# Patient Record
Sex: FEMALE | Race: WHITE | Employment: OTHER | URBAN - METROPOLITAN AREA
[De-identification: names, ages, dates, MRNs, and addresses within clinical notes are randomized per-mention and may not be internally consistent; named-entity substitution may affect disease eponyms.]

---

## 2017-02-08 ENCOUNTER — HOSPITAL ENCOUNTER (OUTPATIENT)
Dept: GENERAL RADIOLOGY | Age: 72
Discharge: HOME | End: 2017-02-08
Payer: MEDICARE

## 2017-02-08 DIAGNOSIS — R05.9 COUGH: ICD-10-CM

## 2017-02-08 PROCEDURE — 71020 XR CHEST PA LAT: CPT

## 2017-04-10 ENCOUNTER — OFFICE VISIT (OUTPATIENT)
Dept: FAMILY MEDICINE CLINIC | Age: 72
End: 2017-04-10

## 2017-04-10 VITALS
BODY MASS INDEX: 25.76 KG/M2 | RESPIRATION RATE: 16 BRPM | TEMPERATURE: 98.3 F | DIASTOLIC BLOOD PRESSURE: 89 MMHG | HEIGHT: 65 IN | WEIGHT: 154.6 LBS | HEART RATE: 82 BPM | OXYGEN SATURATION: 98 % | SYSTOLIC BLOOD PRESSURE: 143 MMHG

## 2017-04-10 DIAGNOSIS — R68.89 FLU-LIKE SYMPTOMS: ICD-10-CM

## 2017-04-10 DIAGNOSIS — J11.1 INFLUENZA: Primary | ICD-10-CM

## 2017-04-10 PROBLEM — I49.1 PAC (PREMATURE ATRIAL CONTRACTION): Status: ACTIVE | Noted: 2017-04-10

## 2017-04-10 LAB
QUICKVUE INFLUENZA TEST: POSITIVE
VALID INTERNAL CONTROL?: YES

## 2017-04-10 RX ORDER — CODEINE PHOSPHATE AND GUAIFENESIN 10; 100 MG/5ML; MG/5ML
5 SOLUTION ORAL
Qty: 118 ML | Refills: 0 | Status: SHIPPED | OUTPATIENT
Start: 2017-04-10 | End: 2017-07-26 | Stop reason: ALTCHOICE

## 2017-04-10 RX ORDER — OSELTAMIVIR PHOSPHATE 75 MG/1
75 CAPSULE ORAL 2 TIMES DAILY
Qty: 10 CAP | Refills: 0 | Status: SHIPPED | OUTPATIENT
Start: 2017-04-10 | End: 2017-04-15

## 2017-04-10 NOTE — PROGRESS NOTES
Reeta Duane is a 73 y.o. female   Chief Complaint   Patient presents with    Fever     Fever at 100 this weekend, wet cough, achy, joint aches. No OTC meds, in bed all weekend    Pt thinks she has the flu,  Has been sick in bed all weekend. + fever around 100, took a couple asa yesterday afternoon without much relief.  + cough wet, but non productive. Cough is keeping her up at night. Took a tylenol pm last night with some relief. Pt also has hx of PAC's had echo and was normal.    she is a 73y.o. year old female who presents for evalution. Reviewed PmHx, RxHx, FmHx, SocHx, AllgHx and updated and dated in the chart. Review of Systems - negative except as listed above in the HPI    Objective:     Vitals:    04/10/17 0847   BP: 143/89   Pulse: 82   Resp: 16   Temp: 98.3 °F (36.8 °C)   TempSrc: Oral   SpO2: 98%   Weight: 154 lb 9.6 oz (70.1 kg)   Height: 5' 5\" (1.651 m)       Current Outpatient Prescriptions   Medication Sig    oseltamivir (TAMIFLU) 75 mg capsule Take 1 Cap by mouth two (2) times a day for 5 days.  guaiFENesin-codeine (CHERATUSSIN AC) 100-10 mg/5 mL solution Take 5 mL by mouth three (3) times daily as needed for Cough. Max Daily Amount: 15 mL.  Cholecalciferol, Vitamin D3, (VITAMIN D3) 1,000 unit cap Take  by mouth.  CALCIUM PO Take 1,200 mg by mouth daily.  MULTIVITAMIN PO Take  by mouth. No current facility-administered medications for this visit.         Physical Examination: General appearance - alert, well appearing, and in no distress  Eyes - pupils equal and reactive, extraocular eye movements intact  Ears - bilateral TM's and external ear canals normal  Nose - normal and patent, no erythema, discharge or polyps  Mouth - mucous membranes moist, pharynx normal without lesions  Neck - supple, no significant adenopathy  Chest - clear to auscultation, no wheezes, rales or rhonchi, symmetric air entry  Heart - normal rate, regular rhythm, normal S1, S2, no murmurs, rubs, clicks or gallops      Assessment/ Plan:   Lela Lyon was seen today for fever. Diagnoses and all orders for this visit:    Flu-like symptoms  -     AMB POC RAPID INFLUENZA TEST    Influenza  -     oseltamivir (TAMIFLU) 75 mg capsule; Take 1 Cap by mouth two (2) times a day for 5 days.  -     guaiFENesin-codeine (CHERATUSSIN AC) 100-10 mg/5 mL solution; Take 5 mL by mouth three (3) times daily as needed for Cough. Max Daily Amount: 15 mL. Follow-up Disposition:  Return if symptoms worsen or fail to improve. I have discussed the diagnosis with the patient and the intended plan as seen in the above orders. The patient has received an after-visit summary and questions were answered concerning future plans. Pt conveyed understanding of plan.     Medication Side Effects and Warnings were discussed with patient      June Espinoza DO

## 2017-04-10 NOTE — PROGRESS NOTES
Chief Complaint   Patient presents with    Fever     Fever at 100 this weekend, wet cough, achy, joint aches.  No OTC meds, in bed all weekend

## 2017-07-26 ENCOUNTER — OFFICE VISIT (OUTPATIENT)
Dept: FAMILY MEDICINE CLINIC | Age: 72
End: 2017-07-26

## 2017-07-26 VITALS
SYSTOLIC BLOOD PRESSURE: 158 MMHG | BODY MASS INDEX: 24.99 KG/M2 | DIASTOLIC BLOOD PRESSURE: 76 MMHG | TEMPERATURE: 97.9 F | HEART RATE: 61 BPM | WEIGHT: 150 LBS | HEIGHT: 65 IN | RESPIRATION RATE: 18 BRPM | OXYGEN SATURATION: 99 %

## 2017-07-26 DIAGNOSIS — H65.93 BILATERAL NON-SUPPURATIVE OTITIS MEDIA: Primary | ICD-10-CM

## 2017-07-26 DIAGNOSIS — R05.9 COUGH: ICD-10-CM

## 2017-07-26 DIAGNOSIS — H61.23 EXCESSIVE EAR WAX, BILATERAL: ICD-10-CM

## 2017-07-26 RX ORDER — IBUPROFEN 800 MG/1
800 TABLET ORAL
COMMUNITY

## 2017-07-26 RX ORDER — CODEINE PHOSPHATE AND GUAIFENESIN 10; 100 MG/5ML; MG/5ML
10 SOLUTION ORAL
Qty: 120 ML | Refills: 0 | Status: SHIPPED | OUTPATIENT
Start: 2017-07-26

## 2017-07-26 RX ORDER — AZITHROMYCIN 250 MG/1
TABLET, FILM COATED ORAL
Qty: 6 TAB | Refills: 0 | Status: SHIPPED | OUTPATIENT
Start: 2017-07-26

## 2017-07-26 NOTE — MR AVS SNAPSHOT
Visit Information Date & Time Provider Department Dept. Phone Encounter #  
 7/26/2017  9:30 AM Nicole Leigh, [address] [phone] [id number] Follow-up Instructions Return if symptoms worsen or fail to improve. Upcoming Health Maintenance Date Due Hepatitis C Screening 1945 DTaP/Tdap/Td series (1 - Tdap) 7/21/1968 BREAST CANCER SCRN MAMMOGRAM 7/21/1997 FOBT Q 1 YEAR AGE 50-75 7/21/1997 GLAUCOMA SCREENING Q2Y 7/21/2012 OSTEOPOROSIS SCREENING (DEXA) 7/21/2012 Pneumococcal 65+ Low/Medium Risk (1 of 2 - PCV13) 7/21/2012 MEDICARE YEARLY EXAM 7/21/2012 INFLUENZA AGE 9 TO ADULT 8/1/2017 Allergies as of 7/26/2017  Review Complete On: 7/26/2017 By: Nicole Leigh NP No Known Allergies Current Immunizations  Never Reviewed Name Date Zoster Vaccine, Live 1/1/2009 Not reviewed this visit You Were Diagnosed With   
  
 Codes Comments Excessive ear wax, bilateral    -  Primary ICD-10-CM: H61.23 
ICD-9-CM: 380.4 Bilateral non-suppurative otitis media     ICD-10-CM: H65.93 
ICD-9-CM: 381.4 Cough     ICD-10-CM: R05 ICD-9-CM: 828. 2 Vitals BP Pulse Temp Resp Height(growth percentile) Weight(growth percentile) 158/76 (BP 1 Location: Left arm, BP Patient Position: At rest) 61 97.9 °F (36.6 °C) (Oral) 18 5' 5\" (1.651 m) 150 lb (68 kg) SpO2 BMI OB Status Smoking Status 99% 24.96 kg/m2 Postmenopausal Never Smoker BMI and BSA Data Body Mass Index Body Surface Area 24.96 kg/m 2 1.77 m 2 Preferred Pharmacy Pharmacy Name Phone [facility] [phone] Your Updated Medication List  
  
   
This list is accurate as of: 7/26/17  9:59 AM.  Always use your most recent med list.  
  
  
  
  
 azithromycin 250 mg tablet Commonly known as:  Francis Clos Take by mouth, take two tablets today then one tablet daily for 4 more days. CALCIUM PO Take 1,200 mg by mouth daily. carbamide peroxide 6.5 % otic solution Commonly known as:  EAR WAX REMOVAL DROPS Administer 5 Drops into each ear two (2) times a day. guaiFENesin-codeine 100-10 mg/5 mL solution Commonly known as:  Shanell Samuel Take 10 mL by mouth three (3) times daily as needed for Cough. Max Daily Amount: 30 mL. ibuprofen 800 mg tablet Commonly known as:  MOTRIN Take 800 mg by mouth every six (6) hours as needed for Pain. MULTIVITAMIN PO Take 1 Tab by mouth daily. VITAMIN D3 1,000 unit Cap Generic drug:  cholecalciferol Take 1,000 Units by mouth daily. Prescriptions Printed Refills  
 guaiFENesin-codeine (GUAIATUSSIN AC) 100-10 mg/5 mL solution 0 Sig: Take 10 mL by mouth three (3) times daily as needed for Cough. Max Daily Amount: 30 mL. Class: Print Route: Oral  
  
Prescriptions Sent to Pharmacy Refills  
 carbamide peroxide (EAR WAX REMOVAL DROPS) 6.5 % otic solution 0 Sig: Administer 5 Drops into each ear two (2) times a day. Class: Normal  
 Pharmacy: [facility], [address] Ph #: [phone] Route: Both Ears  
 azithromycin (ZITHROMAX) 250 mg tablet 0 Sig: Take by mouth, take two tablets today then one tablet daily for 4 more days. Class: Normal  
 Pharmacy: [facility], [address] Ph #: [phone] Follow-up Instructions Return if symptoms worsen or fail to improve. Patient Instructions Cough: Care Instructions Your Care Instructions A cough is your body's response to something that bothers your throat or airways. Many things can cause a cough. You might cough because of a cold or the flu, bronchitis, or asthma.  Smoking, postnasal drip, allergies, and stomach acid that backs up into your throat also can cause coughs. A cough is a symptom, not a disease. Most coughs stop when the cause, such as a cold, goes away. You can take a few steps at home to cough less and feel better. Follow-up care is a key part of your treatment and safety. Be sure to make and go to all appointments, and call your doctor if you are having problems. It's also a good idea to know your test results and keep a list of the medicines you take. How can you care for yourself at home? · Drink lots of water and other fluids. This helps thin the mucus and soothes a dry or sore throat. Honey or lemon juice in hot water or tea may ease a dry cough. · Take cough medicine as directed by your doctor. · Prop up your head on pillows to help you breathe and ease a dry cough. · Try cough drops to soothe a dry or sore throat. Cough drops don't stop a cough. Medicine-flavored cough drops are no better than candy-flavored drops or hard candy. · Do not smoke. Avoid secondhand smoke. If you need help quitting, talk to your doctor about stop-smoking programs and medicines. These can increase your chances of quitting for good. When should you call for help? Call 911 anytime you think you may need emergency care. For example, call if: 
· You have severe trouble breathing. Call your doctor now or seek immediate medical care if: 
· You cough up blood. · You have new or worse trouble breathing. · You have a new or higher fever. · You have a new rash. Watch closely for changes in your health, and be sure to contact your doctor if: 
· You cough more deeply or more often, especially if you notice more mucus or a change in the color of your mucus. · You have new symptoms, such as a sore throat, an earache, or sinus pain. · You do not get better as expected. Where can you learn more? Go to http://seamus-calvin.info/.  
Enter D247 in the search box to learn more about \"Cough: Care Instructions. \" Current as of: March 25, 2017 Content Version: 11.3 © 7508-3855 [employer]. Care instructions adapted under license by [employer] (which disclaims liability or warranty for this information). If you have questions about a medical condition or this instruction, always ask your healthcare professional. [address] any warranty or liability for your use of this information. Introducing [facility]! Dear Katharine Marcano: 
Thank you for requesting a [employer] account. Our records indicate that you already have an active [employer] account. You can access your account anytime at [email or web address] Did you know that you can access your hospital and ER discharge instructions at any time in [employer]? You can also review all of your test results from your hospital stay or ER visit. Additional Information If you have questions, please visit the Frequently Asked Questions section of the [employer] website at [email or web address] Remember, [employer] is NOT to be used for urgent needs. For medical emergencies, dial 911. Now available from your iPhone and Android! Please provide this summary of care documentation to your next provider. Your primary care clinician is listed as Lori Lucas. If you have any questions after today's visit, please call [phone].

## 2017-07-26 NOTE — PROGRESS NOTES
Subjective:   Swetha Bazzi is a 81 y.o. female who complains of congestion, sneezing, sore throat, myalgias and cough keeping her awake at night for 7 days, she has been out of the country to [place] on vacation, she became ill on her last day there and was sick traveling back, and now the cough has become quite severe day and night. She denies a history of shortness of breath and wheezing. No sputum or fever. She has chronic ear wax build-up in both ears, she states she keeps forgetting to use the ear wax removal drops that have been recommended and usually goes into her PCP office for ear wax removal when needed. She denies pain, she is having difficulty hearing well. Evaluation to date: none. Treatment to date: OTC products. Patient does not smoke cigarettes. Relevant PMH: No pertinent additional PMH. Patient Active Problem List   Diagnosis Code    PAC (premature atrial contraction) I49.1     Patient Active Problem List    Diagnosis Date Noted    PAC (premature atrial contraction) 04/10/2017     Current Outpatient Prescriptions   Medication Sig Dispense Refill    ibuprofen (MOTRIN) 800 mg tablet Take 800 mg by mouth every six (6) hours as needed for Pain.  carbamide peroxide (EAR WAX REMOVAL DROPS) 6.5 % otic solution Administer 5 Drops into each ear two (2) times a day. 30 mL 0    azithromycin (ZITHROMAX) 250 mg tablet Take by mouth, take two tablets today then one tablet daily for 4 more days. 6 Tab 0    guaiFENesin-codeine (GUAIATUSSIN AC) 100-10 mg/5 mL solution Take 10 mL by mouth three (3) times daily as needed for Cough. Max Daily Amount: 30 mL. 120 mL 0    Cholecalciferol, Vitamin D3, (VITAMIN D3) 1,000 unit cap Take 1,000 Units by mouth daily.  CALCIUM PO Take 1,200 mg by mouth daily.  MULTIVITAMIN PO Take 1 Tab by mouth daily.        No Known Allergies  Past Medical History:   Diagnosis Date    Schistosomiasis 2000    (treated)     Past Surgical History: Procedure Laterality Date    ENDOSCOPY, COLON, DIAGNOSTIC  1/2007    f/u every 3 yrs.  HX BREAST BIOPSY  1995    (left breast)     Family History   Problem Relation Age of Onset    Arthritis-osteo Mother     Stroke Mother     Colon Cancer Mother     COPD Mother     Heart Failure Mother     Macular Degen Mother     Alcohol abuse Father     Stroke Father     Diabetes Brother      Social History   Substance Use Topics    Smoking status: Never Smoker    Smokeless tobacco: Not on file    Alcohol use 0.0 - 0.5 oz/week     0 - 1 Standard drinks or equivalent per week        Review of Systems  Pertinent items are noted in HPI. Objective:     Visit Vitals    /76 (BP 1 Location: Left arm, BP Patient Position: At rest)    Pulse 61    Temp 97.9 °F (36.6 °C) (Oral)    Resp 18    Ht 5' 5\" (1.651 m)    Wt 150 lb (68 kg)    SpO2 99%    BMI 24.96 kg/m2     General:  alert, cooperative, no distress   Eyes: negative   Ears: abnormal external canal AD - ceruminosis impacting canal, abnormal TM AD - erythematous, bulging, abnormal external canal AS - ceruminosis impacting canal, abnormal TM AS - erythematous, bulging   Sinuses: Normal paranasal sinuses without tenderness   Mouth:  Lips, mucosa, and tongue normal. Teeth and gums normal   Neck: supple, symmetrical, trachea midline and no adenopathy. Heart: S1 and S2 normal, no murmurs noted. Lungs: clear to auscultation bilaterally   Abdomen: soft, non-tender. Bowel sounds normal. No masses,  no organomegaly        Assessment/Plan:   otitis media, cough  Chronic Ceruminosis of both ears requiring regular use of Ear Wax Removal drops, Non-Compliant with treatment  Instructed patient on the importance of keeping ears clear from cerumen build-up by using the ear wax removal drops nightly for 1 week, then 2 x weekly to keep ears clear. Suggested symptomatic OTC remedies. RTC prn.   Orders Placed This Encounter    ibuprofen (MOTRIN) 800 mg tablet Sig: Take 800 mg by mouth every six (6) hours as needed for Pain.  carbamide peroxide (EAR WAX REMOVAL DROPS) 6.5 % otic solution     Sig: Administer 5 Drops into each ear two (2) times a day. Dispense:  30 mL     Refill:  0    azithromycin (ZITHROMAX) 250 mg tablet     Sig: Take by mouth, take two tablets today then one tablet daily for 4 more days. Dispense:  6 Tab     Refill:  0    guaiFENesin-codeine (GUAIATUSSIN AC) 100-10 mg/5 mL solution     Sig: Take 10 mL by mouth three (3) times daily as needed for Cough. Max Daily Amount: 30 mL.      Dispense:  120 mL     Refill:  0

## 2017-07-26 NOTE — PATIENT INSTRUCTIONS
Cough: Care Instructions  Your Care Instructions  A cough is your body's response to something that bothers your throat or airways. Many things can cause a cough. You might cough because of a cold or the flu, bronchitis, or asthma. Smoking, postnasal drip, allergies, and stomach acid that backs up into your throat also can cause coughs. A cough is a symptom, not a disease. Most coughs stop when the cause, such as a cold, goes away. You can take a few steps at home to cough less and feel better. Follow-up care is a key part of your treatment and safety. Be sure to make and go to all appointments, and call your doctor if you are having problems. It's also a good idea to know your test results and keep a list of the medicines you take. How can you care for yourself at home? · Drink lots of water and other fluids. This helps thin the mucus and soothes a dry or sore throat. Honey or lemon juice in hot water or tea may ease a dry cough. · Take cough medicine as directed by your doctor. · Prop up your head on pillows to help you breathe and ease a dry cough. · Try cough drops to soothe a dry or sore throat. Cough drops don't stop a cough. Medicine-flavored cough drops are no better than candy-flavored drops or hard candy. · Do not smoke. Avoid secondhand smoke. If you need help quitting, talk to your doctor about stop-smoking programs and medicines. These can increase your chances of quitting for good. When should you call for help? Call 911 anytime you think you may need emergency care. For example, call if:  · You have severe trouble breathing. Call your doctor now or seek immediate medical care if:  · You cough up blood. · You have new or worse trouble breathing. · You have a new or higher fever. · You have a new rash.   Watch closely for changes in your health, and be sure to contact your doctor if:  · You cough more deeply or more often, especially if you notice more mucus or a change in the color of your mucus. · You have new symptoms, such as a sore throat, an earache, or sinus pain. · You do not get better as expected. Where can you learn more? Go to http://seamus-calvin.info/. Enter D279 in the search box to learn more about \"Cough: Care Instructions. \"  Current as of: March 25, 2017  Content Version: 11.3  © 9992-6530 [employer]. Care instructions adapted under license by [employer] (which disclaims liability or warranty for this information). If you have questions about a medical condition or this instruction, always ask your healthcare professional. [address] any warranty or liability for your use of this information.

## 2019-02-19 ENCOUNTER — HOSPITAL ENCOUNTER (OUTPATIENT)
Dept: CT IMAGING | Age: 74
Discharge: HOME | End: 2019-02-19
Attending: INTERNAL MEDICINE
Payer: SELF-PAY

## 2019-02-19 DIAGNOSIS — Z00.00 PREVENTATIVE HEALTH CARE: ICD-10-CM

## 2019-02-19 PROCEDURE — 75571 CT HRT W/O DYE W/CA TEST: CPT

## 2022-03-18 PROBLEM — I49.1 PAC (PREMATURE ATRIAL CONTRACTION): Status: ACTIVE | Noted: 2017-04-10

## 2022-11-16 ENCOUNTER — TRANSCRIBE ORDER (OUTPATIENT)
Dept: SCHEDULING | Age: 77
End: 2022-11-16

## 2022-11-16 DIAGNOSIS — Z13.6 SCREENING FOR HEART DISEASE: Primary | ICD-10-CM

## 2023-01-20 ENCOUNTER — TRANSCRIBE ORDER (OUTPATIENT)
Dept: SCHEDULING | Age: 78
End: 2023-01-20

## 2023-01-20 DIAGNOSIS — Z00.00 ROUTINE GENERAL MEDICAL EXAMINATION AT A HEALTH CARE FACILITY: Primary | ICD-10-CM

## 2023-04-22 DIAGNOSIS — Z13.6 SCREENING FOR HEART DISEASE: Primary | ICD-10-CM

## 2024-02-22 ENCOUNTER — HOSPITAL ENCOUNTER (OUTPATIENT)
Facility: HOSPITAL | Age: 79
Discharge: HOME | End: 2024-02-22

## 2024-02-22 DIAGNOSIS — Z13.6 ENCOUNTER FOR SCREENING FOR CARDIOVASCULAR DISORDERS: ICD-10-CM

## 2024-02-22 PROCEDURE — 75571 CT HRT W/O DYE W/CA TEST: CPT

## 2024-02-23 NOTE — CARDIO/PULMONARY
Reached patient at her given mobile number and shared her coronary artery calcium score of 2 with her.  We discussed the meaning of this score.  Patient plans to follow up with her PCP, Dr. Anjum Hoover, who ordered this test.  Patient has no further questions at this time.

## 2025-08-09 ENCOUNTER — OFFICE VISIT (OUTPATIENT)
Age: 80
End: 2025-08-09

## 2025-08-09 VITALS
HEIGHT: 65 IN | DIASTOLIC BLOOD PRESSURE: 93 MMHG | OXYGEN SATURATION: 98 % | TEMPERATURE: 98.2 F | WEIGHT: 164.8 LBS | BODY MASS INDEX: 27.46 KG/M2 | SYSTOLIC BLOOD PRESSURE: 147 MMHG | HEART RATE: 82 BPM

## 2025-08-09 DIAGNOSIS — S61.411A LACERATION OF RIGHT HAND WITHOUT FOREIGN BODY, INITIAL ENCOUNTER: Primary | ICD-10-CM

## 2025-08-15 ENCOUNTER — OFFICE VISIT (OUTPATIENT)
Age: 80
End: 2025-08-15

## 2025-08-15 VITALS
HEART RATE: 77 BPM | SYSTOLIC BLOOD PRESSURE: 125 MMHG | BODY MASS INDEX: 27.32 KG/M2 | RESPIRATION RATE: 16 BRPM | TEMPERATURE: 98.2 F | WEIGHT: 164 LBS | DIASTOLIC BLOOD PRESSURE: 75 MMHG | HEIGHT: 65 IN | OXYGEN SATURATION: 98 %

## 2025-08-15 DIAGNOSIS — Z48.02 VISIT FOR SUTURE REMOVAL: ICD-10-CM

## 2025-08-15 DIAGNOSIS — S61.411D LACERATION OF RIGHT HAND WITHOUT FOREIGN BODY, SUBSEQUENT ENCOUNTER: Primary | ICD-10-CM

## 2025-08-15 DIAGNOSIS — R03.0 ELEVATED BLOOD PRESSURE READING: ICD-10-CM

## 2025-08-15 ASSESSMENT — ENCOUNTER SYMPTOMS: COLOR CHANGE: 0
